# Patient Record
Sex: FEMALE | Race: WHITE | NOT HISPANIC OR LATINO | Employment: PART TIME | URBAN - METROPOLITAN AREA
[De-identification: names, ages, dates, MRNs, and addresses within clinical notes are randomized per-mention and may not be internally consistent; named-entity substitution may affect disease eponyms.]

---
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
## 2023-06-11 ENCOUNTER — HOSPITAL ENCOUNTER (EMERGENCY)
Facility: HOSPITAL | Age: 18
Discharge: HOME | End: 2023-06-11
Attending: EMERGENCY MEDICINE
Payer: COMMERCIAL

## 2023-06-11 VITALS
DIASTOLIC BLOOD PRESSURE: 67 MMHG | SYSTOLIC BLOOD PRESSURE: 125 MMHG | RESPIRATION RATE: 16 BRPM | OXYGEN SATURATION: 97 % | WEIGHT: 140.21 LBS | TEMPERATURE: 97.8 F | BODY MASS INDEX: 24.84 KG/M2 | HEIGHT: 63 IN | HEART RATE: 63 BPM

## 2023-06-11 DIAGNOSIS — G43.909 MIGRAINE: ICD-10-CM

## 2023-06-11 DIAGNOSIS — R55 SYNCOPE: Primary | ICD-10-CM

## 2023-06-11 LAB
ANION GAP SERPL CALCULATED.3IONS-SCNC: 16 MMOL/L (ref 4–13)
ATRIAL RATE: 61 BPM
BASOPHILS # BLD AUTO: 0.09 THOUSANDS/ÂΜL (ref 0–0.1)
BASOPHILS NFR BLD AUTO: 1 % (ref 0–1)
BUN SERPL-MCNC: 7 MG/DL (ref 5–25)
CALCIUM SERPL-MCNC: 9.8 MG/DL (ref 8.4–10.2)
CHLORIDE SERPL-SCNC: 103 MMOL/L (ref 96–108)
CO2 SERPL-SCNC: 19 MMOL/L (ref 21–32)
CREAT SERPL-MCNC: 0.65 MG/DL (ref 0.6–1.3)
EOSINOPHIL # BLD AUTO: 0.01 THOUSAND/ÂΜL (ref 0–0.61)
EOSINOPHIL NFR BLD AUTO: 0 % (ref 0–6)
ERYTHROCYTE [DISTWIDTH] IN BLOOD BY AUTOMATED COUNT: 12.6 % (ref 11.6–15.1)
GFR SERPL CREATININE-BSD FRML MDRD: 128 ML/MIN/1.73SQ M
GLUCOSE SERPL-MCNC: 80 MG/DL (ref 65–140)
HCT VFR BLD AUTO: 42.1 % (ref 34.8–46.1)
HGB BLD-MCNC: 14 G/DL (ref 11.5–15.4)
IMM GRANULOCYTES # BLD AUTO: 0.13 THOUSAND/UL (ref 0–0.2)
IMM GRANULOCYTES NFR BLD AUTO: 1 % (ref 0–2)
LYMPHOCYTES # BLD AUTO: 1.71 THOUSANDS/ÂΜL (ref 0.6–4.47)
LYMPHOCYTES NFR BLD AUTO: 14 % (ref 14–44)
MCH RBC QN AUTO: 29.9 PG (ref 26.8–34.3)
MCHC RBC AUTO-ENTMCNC: 33.3 G/DL (ref 31.4–37.4)
MCV RBC AUTO: 90 FL (ref 82–98)
MONOCYTES # BLD AUTO: 0.43 THOUSAND/ÂΜL (ref 0.17–1.22)
MONOCYTES NFR BLD AUTO: 4 % (ref 4–12)
NEUTROPHILS # BLD AUTO: 9.63 THOUSANDS/ÂΜL (ref 1.85–7.62)
NEUTS SEG NFR BLD AUTO: 80 % (ref 43–75)
NRBC BLD AUTO-RTO: 0 /100 WBCS
P AXIS: 63 DEGREES
PLATELET # BLD AUTO: 468 THOUSANDS/UL (ref 149–390)
PMV BLD AUTO: 10 FL (ref 8.9–12.7)
POTASSIUM SERPL-SCNC: 3.3 MMOL/L (ref 3.5–5.3)
PR INTERVAL: 146 MS
QRS AXIS: 51 DEGREES
QRSD INTERVAL: 80 MS
QT INTERVAL: 426 MS
QTC INTERVAL: 428 MS
RBC # BLD AUTO: 4.68 MILLION/UL (ref 3.81–5.12)
SODIUM SERPL-SCNC: 138 MMOL/L (ref 135–147)
T WAVE AXIS: 25 DEGREES
VENTRICULAR RATE: 61 BPM
WBC # BLD AUTO: 12 THOUSAND/UL (ref 4.31–10.16)

## 2023-06-11 PROCEDURE — 93010 ELECTROCARDIOGRAM REPORT: CPT | Performed by: INTERNAL MEDICINE

## 2023-06-11 PROCEDURE — 96368 THER/DIAG CONCURRENT INF: CPT

## 2023-06-11 PROCEDURE — 80048 BASIC METABOLIC PNL TOTAL CA: CPT | Performed by: EMERGENCY MEDICINE

## 2023-06-11 PROCEDURE — 85025 COMPLETE CBC W/AUTO DIFF WBC: CPT | Performed by: EMERGENCY MEDICINE

## 2023-06-11 PROCEDURE — 96365 THER/PROPH/DIAG IV INF INIT: CPT

## 2023-06-11 PROCEDURE — 96375 TX/PRO/DX INJ NEW DRUG ADDON: CPT

## 2023-06-11 PROCEDURE — 99284 EMERGENCY DEPT VISIT MOD MDM: CPT

## 2023-06-11 PROCEDURE — 93005 ELECTROCARDIOGRAM TRACING: CPT

## 2023-06-11 PROCEDURE — 36415 COLL VENOUS BLD VENIPUNCTURE: CPT | Performed by: EMERGENCY MEDICINE

## 2023-06-11 RX ORDER — METOCLOPRAMIDE HYDROCHLORIDE 5 MG/ML
10 INJECTION INTRAMUSCULAR; INTRAVENOUS ONCE
Status: COMPLETED | OUTPATIENT
Start: 2023-06-11 | End: 2023-06-11

## 2023-06-11 RX ORDER — KETOROLAC TROMETHAMINE 30 MG/ML
30 INJECTION, SOLUTION INTRAMUSCULAR; INTRAVENOUS ONCE
Status: COMPLETED | OUTPATIENT
Start: 2023-06-11 | End: 2023-06-11

## 2023-06-11 RX ORDER — MAGNESIUM SULFATE HEPTAHYDRATE 40 MG/ML
2 INJECTION, SOLUTION INTRAVENOUS ONCE
Status: COMPLETED | OUTPATIENT
Start: 2023-06-11 | End: 2023-06-11

## 2023-06-11 RX ORDER — SODIUM CHLORIDE, SODIUM GLUCONATE, SODIUM ACETATE, POTASSIUM CHLORIDE, MAGNESIUM CHLORIDE, SODIUM PHOSPHATE, DIBASIC, AND POTASSIUM PHOSPHATE .53; .5; .37; .037; .03; .012; .00082 G/100ML; G/100ML; G/100ML; G/100ML; G/100ML; G/100ML; G/100ML
1000 INJECTION, SOLUTION INTRAVENOUS ONCE
Status: DISCONTINUED | OUTPATIENT
Start: 2023-06-11 | End: 2023-06-11

## 2023-06-11 RX ORDER — DIPHENHYDRAMINE HYDROCHLORIDE 50 MG/ML
25 INJECTION INTRAMUSCULAR; INTRAVENOUS ONCE
Status: COMPLETED | OUTPATIENT
Start: 2023-06-11 | End: 2023-06-11

## 2023-06-11 RX ADMIN — DIPHENHYDRAMINE HYDROCHLORIDE 25 MG: 50 INJECTION, SOLUTION INTRAMUSCULAR; INTRAVENOUS at 14:41

## 2023-06-11 RX ADMIN — SODIUM CHLORIDE, SODIUM LACTATE, POTASSIUM CHLORIDE, AND CALCIUM CHLORIDE 1000 ML: .6; .31; .03; .02 INJECTION, SOLUTION INTRAVENOUS at 14:56

## 2023-06-11 RX ADMIN — KETOROLAC TROMETHAMINE 30 MG: 30 INJECTION, SOLUTION INTRAMUSCULAR; INTRAVENOUS at 14:40

## 2023-06-11 RX ADMIN — METOCLOPRAMIDE 10 MG: 5 INJECTION, SOLUTION INTRAMUSCULAR; INTRAVENOUS at 14:40

## 2023-06-11 RX ADMIN — MAGNESIUM SULFATE HEPTAHYDRATE 2 G: 40 INJECTION, SOLUTION INTRAVENOUS at 14:43

## 2023-06-11 NOTE — Clinical Note
Evelin Gauthier was seen and treated in our emergency department on 6/11/2023  Diagnosis:     Joey Salvage  may return to work on return date  She may return on this date: 06/13/2023         If you have any questions or concerns, please don't hesitate to call        Luigi Mao, DO    ______________________________           _______________          _______________  Hospital Representative                              Date                                Time

## 2023-06-11 NOTE — ED PROVIDER NOTES
History  Chief Complaint   Patient presents with   • Syncope     Pt woke up this morning with a migraine with nausea and reports that can make her heart race  Reports if she throws up it can make her heart rate better  She threw up a few times but it didn't get better  Pt was getting dizzy about an hour ago and then a few moments ago everything went black and she tried to go sit down and didn't make it, pharmacist woke her up not sure how long she was out or if she hit her head  Pt is shakey and she said her head feels foggy  Hx of POTS      18y F here for syncopal episode  Pt w/ hx of POTS but no long on daily medications for this - able to manage symptoms w/ diet, hydration, etc   Woke this am w/ tension migraine assoc w/ nausea  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  Was trying to hydrate well with the nausea, but not really eating much due to the continued nausea  Started w/ palpitations which when she's had in the past w/ nausea, tends to improve once she vomits  Had two episodes of emesis w/ transient improvement in her palpitaitons, but with the third episode of palpitations, started to lightheaded and woozy  Tried getting to a chair but vision when black and she fainted before being able to sit in the chair  Coworkers heard her fall and found her on the floor - she regained consciousness quickly and was sent down to be evaluated  Currently pt still c/o diffuse HA, typical of previous migraines with addition of some pain to the her forehead that she's not sure if it is from the fall when she fainted  Had diffuse cervical muscle tightness that was present prior to the fall  Has some tingling in there hands / feet that is typical of previous episodes of LH/palpitations assoc w/ her POTS  Denies cp, no sob/rodrigez, no abd pain  Still w/ some nausea, no diarrhea, no urinary symptoms        History provided by:  Patient   used: No        Prior to Admission Medications Prescriptions Last Dose Informant Patient Reported? Taking? Junel 1/20 1-20 MG-MCG per tablet   Yes No   amitriptyline (ELAVIL) 10 mg tablet   Yes No   ondansetron (ZOFRAN-ODT) 4 mg disintegrating tablet   Yes No   Sig: USE ONE TABLET AS NEEDED FOR HEADACHES NO MORE THAN TWICE IN 1 DAY OR 4 TIMES IN ONE WEEK      Facility-Administered Medications: None       Past Medical History:   Diagnosis Date   • Low blood pressure    • Migraines    • POTS (postural orthostatic tachycardia syndrome)        History reviewed  No pertinent surgical history  History reviewed  No pertinent family history  I have reviewed and agree with the history as documented  E-Cigarette/Vaping     E-Cigarette/Vaping Substances     Social History     Tobacco Use   • Smoking status: Never   • Smokeless tobacco: Never   Substance Use Topics   • Alcohol use: Never   • Drug use: Never       Review of Systems   All other systems reviewed and are negative  Physical Exam  Physical Exam  Vitals and nursing note reviewed  Constitutional:       General: She is not in acute distress  Appearance: Normal appearance  She is not ill-appearing, toxic-appearing or diaphoretic  HENT:      Head: Normocephalic and atraumatic  Right Ear: Tympanic membrane normal  No hemotympanum  Left Ear: Tympanic membrane normal  No hemotympanum  Nose: Nose normal       Mouth/Throat:      Mouth: Mucous membranes are moist    Eyes:      Conjunctiva/sclera: Conjunctivae normal       Pupils: Pupils are equal, round, and reactive to light  Neck:     Cardiovascular:      Rate and Rhythm: Normal rate and regular rhythm  Heart sounds: No murmur heard  Pulmonary:      Effort: Pulmonary effort is normal       Breath sounds: Normal breath sounds  Abdominal:      Palpations: Abdomen is soft  Tenderness: There is no abdominal tenderness  Musculoskeletal:         General: No swelling or tenderness        Cervical back: Muscular tenderness present  No spinous process tenderness  Skin:     General: Skin is warm  Neurological:      General: No focal deficit present  Mental Status: She is alert and oriented to person, place, and time     Psychiatric:         Mood and Affect: Mood normal          Vital Signs  ED Triage Vitals   Temperature Pulse Respirations Blood Pressure SpO2   06/11/23 1400 06/11/23 1400 06/11/23 1400 06/11/23 1400 06/11/23 1400   97 8 °F (36 6 °C) 63 16 125/67 97 %      Temp src Heart Rate Source Patient Position - Orthostatic VS BP Location FiO2 (%)   -- -- 06/11/23 1400 06/11/23 1400 --     Lying Right arm       Pain Score       06/11/23 1440       7           Vitals:    06/11/23 1400   BP: 125/67   Pulse: 63   Patient Position - Orthostatic VS: Lying         Visual Acuity  Visual Acuity    Flowsheet Row Most Recent Value   L Pupil Size (mm) 4   R Pupil Size (mm) 4          ED Medications  Medications   diphenhydrAMINE (BENADRYL) injection 25 mg (25 mg Intravenous Given 6/11/23 1441)   metoclopramide (REGLAN) injection 10 mg (10 mg Intravenous Given 6/11/23 1440)   ketorolac (TORADOL) injection 30 mg (30 mg Intravenous Given 6/11/23 1440)   magnesium sulfate 2 g/50 mL IVPB (premix) 2 g (0 g Intravenous Stopped 6/11/23 1555)   lactated ringers bolus 1,000 mL (0 mL Intravenous Stopped 6/11/23 1555)       Diagnostic Studies  Results Reviewed     Procedure Component Value Units Date/Time    Basic metabolic panel [[id number]]  (Abnormal) Collected: 06/11/23 1439    Lab Status: Final result Specimen: Blood from Arm, Left Updated: 06/11/23 1527     Sodium 138 mmol/L      Potassium 3 3 mmol/L      Chloride 103 mmol/L      CO2 19 mmol/L      ANION GAP 16 mmol/L      BUN 7 mg/dL      Creatinine 0 65 mg/dL      Glucose 80 mg/dL      Calcium 9 8 mg/dL      eGFR 128 ml/min/1 73sq m     Narrative:      [place] guidelines for Chronic Kidney Disease (CKD):   •  Stage 1 with normal or high GFR (GFR > 90 mL/min/1 73 square meters)  •  Stage 2 Mild CKD (GFR = 60-89 mL/min/1 73 square meters)  •  Stage 3A Moderate CKD (GFR = 45-59 mL/min/1 73 square meters)  •  Stage 3B Moderate CKD (GFR = 30-44 mL/min/1 73 square meters)  •  Stage 4 Severe CKD (GFR = 15-29 mL/min/1 73 square meters)  •  Stage 5 End Stage CKD (GFR <15 mL/min/1 73 square meters)  Note: GFR calculation is accurate only with a steady state creatinine    CBC and differential [[id number]]  (Abnormal) Collected: 06/11/23 1439    Lab Status: Final result Specimen: Blood from Arm, Left Updated: 06/11/23 1506     WBC 12 00 Thousand/uL      RBC 4 68 Million/uL      Hemoglobin 14 0 g/dL      Hematocrit 42 1 %      MCV 90 fL      MCH 29 9 pg      MCHC 33 3 g/dL      RDW 12 6 %      MPV 10 0 fL      Platelets 401 Thousands/uL      nRBC 0 /100 WBCs      Neutrophils Relative 80 %      Immat GRANS % 1 %      Lymphocytes Relative 14 %      Monocytes Relative 4 %      Eosinophils Relative 0 %      Basophils Relative 1 %      Neutrophils Absolute 9 63 Thousands/µL      Immature Grans Absolute 0 13 Thousand/uL      Lymphocytes Absolute 1 71 Thousands/µL      Monocytes Absolute 0 43 Thousand/µL      Eosinophils Absolute 0 01 Thousand/µL      Basophils Absolute 0 09 Thousands/µL                  No orders to display              Procedures  Procedures         ED Course  ED Course as of 06/11/23 1622   Sun Jun 11, 2023   1500 WBC(!): 12 00  On medrol dose pack for uri symptoms   1538 Ha resolved, feels much better  Discussed low potassium - has electrolyte drinks at home and will probably be better tolerated than po potassium here in the department  Will recommend pt not work tomorrow, increase fluids and f/u w/ her doctor for any continued symptoms         CRAFFT    Flowsheet Row Most Recent Value   CRAFFT Initial Screen: During the past 12 months, did you:    1  Drink any alcohol (more than a few sips)? No Filed at: 06/11/2023 1404   2   Smoke any marijuana or hashish No Filed "at: 06/11/2023 1404   3  Use anything else to get high? (\"anything else\" includes illegal drugs, over the counter and prescription drugs, and things that you sniff or 'munoz')? No Filed at: 06/11/2023 1404                                          Medical Decision Making  Migraine and recurrent syncope w/ POTS  No need for CTH at this time due to normal neuro and very low risk for any intracranial injury  ddx includes arrhythmia, metabolic abnl, anemia, dehydration  Will ck ekg, labs, give fluids, migraine cocktail and re-eval    Migraine: acute illness or injury  Syncope: acute illness or injury that poses a threat to life or bodily functions     Details: no evidence of life threatening arrhythmia or metabolic disturbance  Amount and/or Complexity of Data Reviewed  Independent Historian: parent  External Data Reviewed: notes  Details: prior cardiology notes reviewed  Labs: ordered  Decision-making details documented in ED Course  ECG/medicine tests: ordered and independent interpretation performed  Risk  Prescription drug management  Disposition  Final diagnoses:   Syncope   Migraine     Time reflects when diagnosis was documented in both MDM as applicable and the Disposition within this note     Time User Action Codes Description Comment    6/11/2023  3:40 PM Julien Jeong [R55] Syncope     6/11/2023  3:41 PM Julien Jeong [G43 [id number]] Migraine       ED Disposition     ED Disposition   Discharge    Condition   Stable    Date/Time   Sun Jun 11, 2023  3:40 PM    Comment   Carrol Jeong discharge to home/self care                 Follow-up Information     Follow up With Specialties Details Why [address], [place]  Schedule an appointment as soon as possible for a visit  If symptoms worsen or if no improvement [address] [id number]  [phone]            Discharge Medication List as of 6/11/2023  3:42 PM      CONTINUE these medications " which have NOT CHANGED    Details   amitriptyline (ELAVIL) 10 mg tablet Starting Mon 2/8/2021, Historical Med      Junel 1/20 1-20 MG-MCG per tablet Starting Tue 2/2/2021, Historical Med      ondansetron (ZOFRAN-ODT) 4 mg disintegrating tablet USE ONE TABLET AS NEEDED FOR HEADACHES NO MORE THAN TWICE IN 1 DAY OR 4 TIMES IN ONE WEEK, Historical Med             No discharge procedures on file      PDMP Review     None          ED Provider  Electronically Signed by           Melvina Neri DO  06/11/23 7337